# Patient Record
Sex: MALE | Race: WHITE | Employment: UNEMPLOYED | URBAN - METROPOLITAN AREA
[De-identification: names, ages, dates, MRNs, and addresses within clinical notes are randomized per-mention and may not be internally consistent; named-entity substitution may affect disease eponyms.]

---

## 2019-02-23 ENCOUNTER — HOSPITAL ENCOUNTER (EMERGENCY)
Age: 29
Discharge: HOME | End: 2019-02-23
Attending: EMERGENCY MEDICINE
Payer: MEDICAID

## 2019-02-23 VITALS
RESPIRATION RATE: 20 BRPM | OXYGEN SATURATION: 98 % | DIASTOLIC BLOOD PRESSURE: 107 MMHG | WEIGHT: 180 LBS | HEART RATE: 100 BPM | BODY MASS INDEX: 27.28 KG/M2 | HEIGHT: 68 IN | SYSTOLIC BLOOD PRESSURE: 152 MMHG | TEMPERATURE: 98.3 F

## 2019-02-23 DIAGNOSIS — R45.87 POOR IMPULSE CONTROL: Primary | ICD-10-CM

## 2019-02-23 DIAGNOSIS — T14.8XXA ABRASION: ICD-10-CM

## 2019-02-23 PROCEDURE — 99283 EMERGENCY DEPT VISIT LOW MDM: CPT | Performed by: EMERGENCY MEDICINE

## 2019-02-23 NOTE — ED TRIAGE NOTES
Pt arrives to the ED via ems per ems pt was found in [facility] parking lot, pt was told to leave and became frustrated  With police, pt calm with EMS, pt states he does not know why he is here. Pt mother refused to pick him up. Pt has no complaints at this time.

## 2019-02-23 NOTE — ED NOTES

## 2019-02-23 NOTE — ED PROVIDER NOTES
75-year-old  male with history of autism presents to the emergency department after he was found sleeping in his car by police at the neighborhood [place]. When they approached the patient, he apparently became upset and started hitting his head on the steering wheel. By the time EMS arrived, patient was calm and cooperative, with no threat to himself or anyone else. Apparently the patient's mother was contacted who recommended he be sent to the emergency department for evaluation. The mother refused to come to the site and has not shown up at the emergency department. According to patient, he lives with a few other individuals in the house where he lives in the lower section of the house. He is very remorseful over his actions and states he would not do it again. He denies any homicidal or suicidal ideation. It appears the patient stayed in the parking lot because the rain and some malfunction of his defrost making it very dangerous for him to drive at that time. The history is provided by the patient. Mental Health Problem This is a new problem. The current episode started 1 to 2 hours ago. The problem has been resolved. Associated symptoms include agitation (now resolved) and self-injury. Pertinent negatives include no confusion, no somnolence, no seizures, no unresponsiveness, no weakness, no delusions, no hallucinations, no violence, no tingling and no numbness. Mental status baseline is normal.  Risk factors: autism. His past medical history is significant for head trauma (self inflicted). His past medical history does not include diabetes, seizures, liver disease, CVA, TIA, AIDS, hypertension, COPD, depression, dementia, psychotropic medication treatment or heart disease. History reviewed. No pertinent past medical history. History reviewed. No pertinent surgical history. History reviewed. No pertinent family history. Social History Socioeconomic History  Marital status: SINGLE Spouse name: Not on file  Number of children: Not on file  Years of education: Not on file  Highest education level: Not on file Social Needs  Financial resource strain: Not on file  Food insecurity - worry: Not on file  Food insecurity - inability: Not on file  Transportation needs - medical: Not on file  Transportation needs - non-medical: Not on file Occupational History  Not on file Tobacco Use  Smoking status: Not on file Substance and Sexual Activity  Alcohol use: Not on file  Drug use: Not on file  Sexual activity: Not on file Other Topics Concern  Not on file Social History Narrative  Not on file ALLERGIES: Patient has no allergy information on record. Review of Systems Constitutional: Negative for chills and fever. Skin: Positive for wound. Neurological: Negative for tingling, seizures, weakness, numbness and headaches. Psychiatric/Behavioral: Positive for agitation (now resolved) and self-injury. Negative for confusion and hallucinations. All other systems reviewed and are negative. Vitals:  
 02/23/19 1006 BP: (!) 152/107 Pulse: 100 Resp: 20 Temp: 98.3 °F (36.8 °C) SpO2: 98% Weight: 81.6 kg (180 lb) Height: 5' 8\" (1.727 m) Physical Exam  
Constitutional: He is oriented to person, place, and time. He appears well-developed and well-nourished. No distress. HENT:  
Head: Normocephalic. Head is with abrasion and with contusion. Head is without raccoon's eyes, without Eugene's sign and without laceration. Right Ear: External ear normal.  
Left Ear: External ear normal.  
Mouth/Throat: Oropharynx is clear and moist.  
Eyes: Conjunctivae and EOM are normal. Pupils are equal, round, and reactive to light. Neck: Normal range of motion. Neck supple. Cardiovascular: Normal rate, regular rhythm, normal heart sounds and intact distal pulses. Pulmonary/Chest: Effort normal and breath sounds normal.  
Abdominal: Soft. Bowel sounds are normal. There is no tenderness. Musculoskeletal: Normal range of motion. He exhibits no edema. Neurological: He is alert and oriented to person, place, and time. He has normal strength. No cranial nerve deficit or sensory deficit. GCS eye subscore is 4. GCS verbal subscore is 5. GCS motor subscore is 6. Skin: Skin is warm and dry. Capillary refill takes less than 2 seconds. Psychiatric: He has a normal mood and affect. Thought content is not paranoid and not delusional. Cognition and memory are normal. He expresses no homicidal and no suicidal ideation. He expresses no suicidal plans and no homicidal plans. Patient has a moderate difficulty finding appropriate words for his expression, either secondary to MR or autism. I believe this is his baseline, as he is able to express his thoughts, it just takes a little while. Nursing note and vitals reviewed. MDM Number of Diagnoses or Management Options Abrasion: new and does not require workup Poor impulse control: new and does not require workup Amount and/or Complexity of Data Reviewed Review and summarize past medical records: yes Risk of Complications, Morbidity, and/or Mortality Presenting problems: moderate Diagnostic procedures: minimal 
Management options: low Patient Progress Patient progress: stable Procedures

## 2019-02-23 NOTE — DISCHARGE INSTRUCTIONS
Patient Education        Contusion: Care Instructions  Your Care Instructions    Contusion is the medical term for a bruise. It is the result of a direct blow or an impact, such as a fall. Contusions are common sports injuries. Most people think of a bruise as a black-and-blue spot. This happens when small blood vessels get torn and leak blood under the skin. But bones, muscles, and organs can also get bruised. This may damage deep tissues but not cause a bruise you can see. The doctor will do a physical exam to find the location of your contusion. You may also have tests to make sure you do not have a more serious injury, such as a broken bone or nerve damage. These may include X-rays or other imaging tests like a CT scan or MRI. Deep-tissue contusions may cause pain and swelling. But if there is no serious damage, they will often get better in a few weeks with home treatment. The doctor has checked you carefully, but problems can develop later. If you notice any problems or new symptoms, get medical treatment right away. Follow-up care is a key part of your treatment and safety. Be sure to make and go to all appointments, and call your doctor if you are having problems. It's also a good idea to know your test results and keep a list of the medicines you take. How can you care for yourself at home? · Put ice or a cold pack on the sore area for 10 to 20 minutes at a time to stop swelling. Put a thin cloth between the ice pack and your skin. · Be safe with medicines. Read and follow all instructions on the label. ? If the doctor gave you a prescription medicine for pain, take it as prescribed. ? If you are not taking a prescription pain medicine, ask your doctor if you can take an over-the-counter medicine. · If you can, prop up the sore area on pillows as much as possible for the next few days. Try to keep the sore area above the level of your heart. When should you call for help?   Call your doctor now or seek immediate medical care if:    · Your pain gets worse.     · You have new or worse swelling.     · You have tingling, weakness, or numbness in the area near the contusion.     · The area near the contusion is cold or pale.    Watch closely for changes in your health, and be sure to contact your doctor if:    · You do not get better as expected. Where can you learn more? Go to http://brissa-stacie.info/. Enter B214 in the search box to learn more about \"Contusion: Care Instructions. \"  Current as of: September 23, 2018  Content Version: 11.9  © 0012-0544 [employer]. Care instructions adapted under license by [employer] (which disclaims liability or warranty for this information). If you have questions about a medical condition or this instruction, always ask your healthcare professional. [address] any warranty or liability for your use of this information.